# Patient Record
Sex: MALE | Race: BLACK OR AFRICAN AMERICAN
[De-identification: names, ages, dates, MRNs, and addresses within clinical notes are randomized per-mention and may not be internally consistent; named-entity substitution may affect disease eponyms.]

---

## 2020-05-09 ENCOUNTER — HOSPITAL ENCOUNTER (OUTPATIENT)
Dept: HOSPITAL 92 - ERS | Age: 65
Setting detail: OBSERVATION
LOS: 2 days | Discharge: HOME | End: 2020-05-11
Attending: SURGERY | Admitting: SURGERY
Payer: MEDICARE

## 2020-05-09 ENCOUNTER — HOSPITAL ENCOUNTER (EMERGENCY)
Dept: HOSPITAL 9 - MADERS | Age: 65
Discharge: ANOTHER HOSPITAL | End: 2020-05-09
Payer: MEDICARE

## 2020-05-09 VITALS — BODY MASS INDEX: 22.8 KG/M2
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
## 2020-05-09 DIAGNOSIS — G89.11: ICD-10-CM

## 2020-05-09 DIAGNOSIS — Z79.82: ICD-10-CM
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
## 2020-05-09 DIAGNOSIS — S52.002A: Primary | ICD-10-CM

## 2020-05-09 DIAGNOSIS — F17.210: ICD-10-CM

## 2020-05-09 DIAGNOSIS — S52.252A: Primary | ICD-10-CM

## 2020-05-09 DIAGNOSIS — S52.022A: ICD-10-CM

## 2020-05-09 DIAGNOSIS — S52.92XA: ICD-10-CM

## 2020-05-09 LAB
ALBUMIN SERPL BCG-MCNC: 4.5 G/DL (ref 3.4–4.8)
ALP SERPL-CCNC: 63 U/L (ref 40–110)
ALT SERPL W P-5'-P-CCNC: 21 U/L (ref 8–55)
ANION GAP SERPL CALC-SCNC: 18 MMOL/L (ref 10–20)
AST SERPL-CCNC: 29 U/L (ref 5–34)
BASOPHILS # BLD AUTO: 0 THOU/UL (ref 0–0.2)
BASOPHILS NFR BLD AUTO: 0.5 % (ref 0–1)
BILIRUB SERPL-MCNC: 1 MG/DL (ref 0.2–1.2)
BUN SERPL-MCNC: 7 MG/DL (ref 8.4–25.7)
CALCIUM SERPL-MCNC: 8.9 MG/DL (ref 7.8–10.44)
CHLORIDE SERPL-SCNC: 101 MMOL/L (ref 98–107)
CO2 SERPL-SCNC: 19 MMOL/L (ref 23–31)
CREAT CL PREDICTED SERPL C-G-VRATE: 0 ML/MIN (ref 70–130)
EOSINOPHIL # BLD AUTO: 0 THOU/UL (ref 0–0.7)
EOSINOPHIL NFR BLD AUTO: 0.3 % (ref 0–10)
GLOBULIN SER CALC-MCNC: 3.3 G/DL (ref 2.4–3.5)
GLUCOSE SERPL-MCNC: 111 MG/DL (ref 80–115)
HGB BLD-MCNC: 14.9 G/DL (ref 14–18)
INR PPP: 0.9
LYMPHOCYTES # BLD: 0.8 THOU/UL (ref 1.2–3.4)
LYMPHOCYTES NFR BLD AUTO: 9.8 % (ref 21–51)
MAGNESIUM SERPL-MCNC: 2 MG/DL (ref 1.6–2.6)
MCH RBC QN AUTO: 23.8 PG (ref 27–31)
MCV RBC AUTO: 75.1 FL (ref 78–98)
MONOCYTES # BLD AUTO: 0.4 THOU/UL (ref 0.11–0.59)
MONOCYTES NFR BLD AUTO: 5.1 % (ref 0–10)
NEUTROPHILS # BLD AUTO: 6.9 THOU/UL (ref 1.4–6.5)
NEUTROPHILS NFR BLD AUTO: 84.3 % (ref 42–75)
PLATELET # BLD AUTO: 137 THOU/UL (ref 130–400)
POTASSIUM SERPL-SCNC: 4.2 MMOL/L (ref 3.5–5.1)
PROTHROMBIN TIME: 11.8 SEC (ref 12–14.7)
RBC # BLD AUTO: 6.26 MILL/UL (ref 4.7–6.1)
SODIUM SERPL-SCNC: 134 MMOL/L (ref 136–145)
WBC # BLD AUTO: 8.2 THOU/UL (ref 4.8–10.8)

## 2020-05-09 PROCEDURE — 86850 RBC ANTIBODY SCREEN: CPT

## 2020-05-09 PROCEDURE — 96376 TX/PRO/DX INJ SAME DRUG ADON: CPT

## 2020-05-09 PROCEDURE — 36415 COLL VENOUS BLD VENIPUNCTURE: CPT

## 2020-05-09 PROCEDURE — 85025 COMPLETE CBC W/AUTO DIFF WBC: CPT

## 2020-05-09 PROCEDURE — 80048 BASIC METABOLIC PNL TOTAL CA: CPT

## 2020-05-09 PROCEDURE — 29125 APPL SHORT ARM SPLINT STATIC: CPT

## 2020-05-09 PROCEDURE — 93005 ELECTROCARDIOGRAM TRACING: CPT

## 2020-05-09 PROCEDURE — 96365 THER/PROPH/DIAG IV INF INIT: CPT

## 2020-05-09 PROCEDURE — 96374 THER/PROPH/DIAG INJ IV PUSH: CPT

## 2020-05-09 PROCEDURE — 71045 X-RAY EXAM CHEST 1 VIEW: CPT

## 2020-05-09 PROCEDURE — 84100 ASSAY OF PHOSPHORUS: CPT

## 2020-05-09 PROCEDURE — 97139 UNLISTED THERAPEUTIC PX: CPT

## 2020-05-09 PROCEDURE — 96375 TX/PRO/DX INJ NEW DRUG ADDON: CPT

## 2020-05-09 PROCEDURE — 97116 GAIT TRAINING THERAPY: CPT

## 2020-05-09 PROCEDURE — 85610 PROTHROMBIN TIME: CPT

## 2020-05-09 PROCEDURE — 99284 EMERGENCY DEPT VISIT MOD MDM: CPT

## 2020-05-09 PROCEDURE — 76000 FLUOROSCOPY <1 HR PHYS/QHP: CPT

## 2020-05-09 PROCEDURE — G0378 HOSPITAL OBSERVATION PER HR: HCPCS

## 2020-05-09 PROCEDURE — 96361 HYDRATE IV INFUSION ADD-ON: CPT

## 2020-05-09 PROCEDURE — S0028 INJECTION, FAMOTIDINE, 20 MG: HCPCS

## 2020-05-09 PROCEDURE — 80053 COMPREHEN METABOLIC PANEL: CPT

## 2020-05-09 PROCEDURE — 86901 BLOOD TYPING SEROLOGIC RH(D): CPT

## 2020-05-09 PROCEDURE — 83735 ASSAY OF MAGNESIUM: CPT

## 2020-05-09 PROCEDURE — 73090 X-RAY EXAM OF FOREARM: CPT

## 2020-05-09 PROCEDURE — 24685 OPTX ULNAR FX PROX END W/FIX: CPT

## 2020-05-09 PROCEDURE — 86900 BLOOD TYPING SEROLOGIC ABO: CPT

## 2020-05-09 PROCEDURE — C1713 ANCHOR/SCREW BN/BN,TIS/BN: HCPCS

## 2020-05-09 NOTE — RAD
Left elbow 2 views



HISTORY: Injury.



FINDINGS: 2 oblique views are included. There is extremely comminuted fracture involving the proximal
 ulna with one quarter shaft width volar displacement of the major fragment and mild apex volar

angulation. There are innumerable ossific fragments and overlying soft tissue swelling. Articular bhavani
face of the ulna is not visualized with these views to evaluate for intra-articular extension of

fracture.



Radiocapitellar alignment is favored to be maintained. Fluid distention of the joint capsule is appar
ent.







  



IMPRESSION :

Extensively comminuted mildly displaced and angulated proximal left ulnar fracture.



Reported By: JUAQUIN Cullen 

Electronically Signed:  5/9/2020 5:19 PM

## 2020-05-10 PROCEDURE — 0PSL04Z REPOSITION LEFT ULNA WITH INTERNAL FIXATION DEVICE, OPEN APPROACH: ICD-10-PCS | Performed by: ORTHOPAEDIC SURGERY

## 2020-05-10 RX ADMIN — CEFAZOLIN SODIUM SCH MLS: 2 SOLUTION INTRAVENOUS at 13:53

## 2020-05-10 RX ADMIN — DOCUSATE SODIUM 50 MG AND SENNOSIDES 8.6 MG SCH TAB: 8.6; 5 TABLET, FILM COATED ORAL at 21:39

## 2020-05-10 RX ADMIN — DOCUSATE SODIUM 50 MG AND SENNOSIDES 8.6 MG SCH TAB: 8.6; 5 TABLET, FILM COATED ORAL at 08:08

## 2020-05-10 RX ADMIN — CEFAZOLIN SODIUM SCH MLS: 2 SOLUTION INTRAVENOUS at 21:39
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
## 2020-05-10 NOTE — RAD
Left forearm 2 views intraoperative fluoroscopy



HISTORY: Fracture.



FINDINGS: Intraoperative fluoroscopy was provided for internal fixation of the comminuted proximal ul
fozia fracture by Dr. Merida. Spot fluoroscopic images show posterior metallic plate and multiple

screws transfixing the fracture.



Alignment is anatomic.



Fluoroscopy time 46 seconds.



Reported By: JUAQUIN Cullen 

Electronically Signed:  5/10/2020 4:56 PM

## 2020-05-10 NOTE — RAD
PORTABLE CHEST:

 

DATE: 5/10/2020.

 

PROVIDED CLINICAL HISTORY: 

Preop.

 

FINDINGS: 

The cardiac silhouette appears prominent, which may be at least partially on the basis of portable te
chnique.  There is no focal consolidation, pleural fluid, or pneumothorax apparent.

 

IMPRESSION: 

No evidence for an acute cardiopulmonary process.

 

POS: JOSIANE

## 2020-05-11 VITALS — TEMPERATURE: 98.5 F | SYSTOLIC BLOOD PRESSURE: 171 MMHG | DIASTOLIC BLOOD PRESSURE: 93 MMHG

## 2020-05-11 LAB
ANION GAP SERPL CALC-SCNC: 13 MMOL/L (ref 10–20)
BASOPHILS # BLD AUTO: 0 THOU/UL (ref 0–0.2)
BASOPHILS NFR BLD AUTO: 0.3 % (ref 0–1)
BUN SERPL-MCNC: 11 MG/DL (ref 8.4–25.7)
CALCIUM SERPL-MCNC: 8.1 MG/DL (ref 7.8–10.44)
CHLORIDE SERPL-SCNC: 100 MMOL/L (ref 98–107)
CO2 SERPL-SCNC: 25 MMOL/L (ref 23–31)
CREAT CL PREDICTED SERPL C-G-VRATE: 94 ML/MIN (ref 70–130)
EOSINOPHIL # BLD AUTO: 0 THOU/UL (ref 0–0.7)
EOSINOPHIL NFR BLD AUTO: 0.2 % (ref 0–10)
GLUCOSE SERPL-MCNC: 130 MG/DL (ref 80–115)
HGB BLD-MCNC: 12.5 G/DL (ref 14–18)
LYMPHOCYTES # BLD: 1 THOU/UL (ref 1.2–3.4)
LYMPHOCYTES NFR BLD AUTO: 11.9 % (ref 21–51)
MAGNESIUM SERPL-MCNC: 1.9 MG/DL (ref 1.6–2.6)
MCH RBC QN AUTO: 24 PG (ref 27–31)
MCV RBC AUTO: 75.8 FL (ref 78–98)
MONOCYTES # BLD AUTO: 0.6 THOU/UL (ref 0.11–0.59)
MONOCYTES NFR BLD AUTO: 7.6 % (ref 0–10)
NEUTROPHILS # BLD AUTO: 6.8 THOU/UL (ref 1.4–6.5)
NEUTROPHILS NFR BLD AUTO: 80.1 % (ref 42–75)
PLATELET # BLD AUTO: 154 THOU/UL (ref 130–400)
POTASSIUM SERPL-SCNC: 4 MMOL/L (ref 3.5–5.1)
RBC # BLD AUTO: 5.22 MILL/UL (ref 4.7–6.1)
SODIUM SERPL-SCNC: 134 MMOL/L (ref 136–145)
WBC # BLD AUTO: 8.4 THOU/UL (ref 4.8–10.8)

## 2020-05-11 RX ADMIN — CEFAZOLIN SODIUM SCH MLS: 2 SOLUTION INTRAVENOUS at 05:40

## 2020-05-11 RX ADMIN — DOCUSATE SODIUM 50 MG AND SENNOSIDES 8.6 MG SCH TAB: 8.6; 5 TABLET, FILM COATED ORAL at 09:04
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
## 2020-05-11 NOTE — PRG
DATE OF SERVICE:  05/10/2020



SUBJECTIVE:  Patient was seen during evening rounds, sitting up in hospital bed, in

no acute distress.  Patient continues to have moderate amount of pain to his left

arm.  Patient is postop repair of his severely comminuted left proximal ulnar

fracture by Dr. Merida.  Patient is tolerating a regular diet.  Patient has good

urinary output. 



OBJECTIVE:  VITAL SIGNS:  Blood pressure 156/84, 98.3 temp, pulse 66, respirations

18, and SpO2 of 95% on room air. 

GENERAL:  Well-appearing middle aged gentleman, awake, alert, in no distress. 

PULMONARY:  Equal chest rise and fall.  Respirations are even and nonlabored. 

EXTREMITIES:  Left upper extremity in a splint which is clean, dry, and intact.

Good sensation.  Cap refill less than 2.  Normal movement of fingers. 



PLAN:  Continue supportive care and pain regimen.  Patient has been instructed to

wear a sling and keep his arm elevated to help with the pain.  Continue occupational

therapy. 







Job ID:  [id number]
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
## 2020-05-16 NOTE — EKG
Test Reason : 

Blood Pressure : ***/*** mmHG

Vent. Rate : 076 BPM     Atrial Rate : 076 BPM

   P-R Int : 166 ms          QRS Dur : 088 ms

    QT Int : 396 ms       P-R-T Axes : 050 -27 014 degrees

   QTc Int : 445 ms

 

*** Poor data quality, interpretation may be adversely affected

Normal sinus rhythm

Normal ECG

 

Confirmed by JOSH CRUZ M.D. ([id number]),  MIRANDA FERNANDES ([id number]) on 5/16/2020 12:44:59 PM

 

Referred By:             Confirmed By:JOSH CRUZ M.D.

## 2020-06-19 ENCOUNTER — HOSPITAL ENCOUNTER (OUTPATIENT)
Dept: HOSPITAL 92 - SDC | Age: 65
Discharge: HOME | End: 2020-06-19
Attending: ORTHOPAEDIC SURGERY
Payer: MEDICARE

## 2020-06-19 VITALS — BODY MASS INDEX: 21.9 KG/M2

## 2020-06-19 DIAGNOSIS — S53.005A: Primary | ICD-10-CM

## 2020-06-19 PROCEDURE — 76000 FLUOROSCOPY <1 HR PHYS/QHP: CPT

## 2020-06-19 PROCEDURE — 0PSJXZZ REPOSITION LEFT RADIUS, EXTERNAL APPROACH: ICD-10-PCS | Performed by: ORTHOPAEDIC SURGERY
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
## 2020-06-19 NOTE — RAD
EXAM: 4 views of the left elbow



HISTORY: Plating of comminuted proximal ulnar fracture



COMPARISON: 6/18/2020



FINDINGS: Limited fluoroscopic views were submitted for interpretation. There is a comminuted fractur
es of the proximal ulna spanned by a plate and screws. No elbow dislocation is seen. No elbow

effusion is seen.



IMPRESSION: Status post ORIF of comminuted proximal ulnar fracture.



Reported By: Saurabh Rodriguez 

Electronically Signed:  6/19/2020 12:57 PM

## 2020-07-13 ENCOUNTER — HOSPITAL ENCOUNTER (OUTPATIENT)
Dept: HOSPITAL 92 - TBSIIMAG | Age: 65
Discharge: HOME | End: 2020-07-13
Attending: PHYSICIAN ASSISTANT
Payer: MEDICARE

## 2020-07-13 DIAGNOSIS — M19.012: ICD-10-CM

## 2020-07-13 DIAGNOSIS — S46.002A: Primary | ICD-10-CM

## 2020-07-13 DIAGNOSIS — S43.432A: ICD-10-CM

## 2020-07-13 DIAGNOSIS — S52.022D: ICD-10-CM

## 2020-07-13 DIAGNOSIS — M75.112: ICD-10-CM

## 2020-07-13 DIAGNOSIS — R60.0: ICD-10-CM

## 2020-07-13 DIAGNOSIS — S53.005D: ICD-10-CM

## 2020-07-13 NOTE — MRI
MRI LEFT SHOULDER:

 

DATE: 7/13/2020.

 

PROVIDED CLINICAL HISTORY: 

Pain.

 

FINDINGS: 

There is low-grade partial-thickness undersurface tearing involving the anterior distal supraspinatus
 tendon at the footplate.  The cranial fibers of the subscapularis also demonstrate low-grade partial
 thickness interstitial tearing near the insertion.  

 

The long-head biceps tendon appears intact and normally located.  

 

There is a markedly thickened and inhomogeneous appearance to the anterior band of the inferior gleno
humeral ligament, without evidence for disruption.  Signal inhomogeneity in the region of the anterio
r axillary recess could reflect thickening of the inferior glenohumeral ligament or synovitis.  

 

There is signal abnormality within the superior labrum compatible with SLAP tear.  The glenoid labrum
 and glenohumeral articular cartilage appear otherwise preserved.  There is probable extension to inv
olve the posterior-superior labrum in a nondisplaced manner.

 

There is a small glenohumeral joint effusion.  

 

The amount of fluid within the subacromial subdeltoid bursa is physiologic.  There is acromioclavicul
ar joint osteoarthrosis with mild mass effect upon the subjacent supraspinatus. 

 

Rotator cuff muscular volume appears preserved.  There is increased signal intensity on fluid sensiti
ve sequences involving the caudal aspects of the subscapularis.

 

No focal concerning regional marrow or muscular signal abnormality is otherwise evident.

 

IMPRESSION: 

1.  Low-grade partial-thickness tears involving the supraspinatus and subscapularis tendons as descri
bed.

 

2.  Markedly thickened and inhomogeneous appearance to the anterior band of the inferior glenohumeral
 ligament suggesting high-grade partial tearing.

 

3.  Edema involving the subscapularis muscle inferiorly compatible with muscular strain.

 

4.  Superior labrum anterior to posterior tear.

 

5.  Acromioclavicular joint osteoarthrosis.

 

POS: VILMA

## 2020-07-28 ENCOUNTER — HOSPITAL ENCOUNTER (OUTPATIENT)
Dept: HOSPITAL 92 - TBSIIMAG | Age: 65
Discharge: HOME | End: 2020-07-28
Attending: ORTHOPAEDIC SURGERY
Payer: MEDICARE

## 2020-07-28 DIAGNOSIS — T84.89XD: ICD-10-CM

## 2020-07-28 DIAGNOSIS — S52.022D: Primary | ICD-10-CM

## 2020-07-28 NOTE — CT
CT LEFT ELBOW WITHOUT CONTRAST:

7/28/20

 

HISTORY: 

Fracture. 

 

COMPARISON: 

Radiograph 7/27/20.

 

FINDINGS: 

Dorsal plate and screw fixation of the comminuted olecranon process fracture with apex lateral bowing
 from stress of the fracture. No significant healing. There is anterior displacement of the radius up
on the capitellum with small fracture of the dorsal radial head which is impacted upon the capitellum
 with no cartilage remaining at the posterior impaction. There is a minimally impacted fracture of th
e lateral radial head with 1-2 mm depression. 

 

Along the medial margin of the joint, there is an articular surface gap of the olecranon up to 5 mm  
with articulation with the trochlea. 

 

The distal humerus appears to be intact.

 

IMPRESSION: 

1.      Anterior displacement of the radial head impacted upon the capitellum with bone on bone artic
ulation and likely cartilage denuding. 

2.      Subtle impaction fracture of the posterolateral radial head with 1-2 mm articular surface dep
ression which involves approximately 10% of the articular surface. 

3.      Minimal healing of the olecranon fracture. 

4.      Along the medial aspect of the olecranon with the trochlea there is a 5 mm articular surface 
gap. 

5.      [place] lateral bowing of the malleable plate and screw fixation. 

 

POS: HOME

## 2020-07-30 ENCOUNTER — HOSPITAL ENCOUNTER (OUTPATIENT)
Dept: HOSPITAL 92 - LABBT | Age: 65
Discharge: HOME | End: 2020-07-30
Attending: ORTHOPAEDIC SURGERY
Payer: MEDICARE

## 2020-07-30 DIAGNOSIS — Z11.59: ICD-10-CM

## 2020-07-30 DIAGNOSIS — S52.022D: ICD-10-CM

## 2020-07-30 DIAGNOSIS — Z01.812: Primary | ICD-10-CM

## 2020-07-30 PROCEDURE — U0003 INFECTIOUS AGENT DETECTION BY NUCLEIC ACID (DNA OR RNA); SEVERE ACUTE RESPIRATORY SYNDROME CORONAVIRUS 2 (SARS-COV-2) (CORONAVIRUS DISEASE [COVID-19]), AMPLIFIED PROBE TECHNIQUE, MAKING USE OF HIGH THROUGHPUT TECHNOLOGIES AS DESCRIBED BY CMS-2020-01-R: HCPCS

## 2020-07-30 PROCEDURE — 87635 SARS-COV-2 COVID-19 AMP PRB: CPT

## 2020-08-03 ENCOUNTER — HOSPITAL ENCOUNTER (OUTPATIENT)
Dept: HOSPITAL 92 - SDC | Age: 65
Discharge: HOME | End: 2020-08-03
Attending: ORTHOPAEDIC SURGERY
Payer: MEDICARE

## 2020-08-03 VITALS — BODY MASS INDEX: 21.5 KG/M2

## 2020-08-03 DIAGNOSIS — S53.492A: ICD-10-CM

## 2020-08-03 DIAGNOSIS — S43.492A: ICD-10-CM

## 2020-08-03 DIAGNOSIS — G89.18: ICD-10-CM

## 2020-08-03 DIAGNOSIS — M75.112: ICD-10-CM

## 2020-08-03 DIAGNOSIS — S52.022D: ICD-10-CM

## 2020-08-03 DIAGNOSIS — M75.02: ICD-10-CM

## 2020-08-03 DIAGNOSIS — M25.832: ICD-10-CM

## 2020-08-03 DIAGNOSIS — F17.210: ICD-10-CM
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
## 2020-08-03 DIAGNOSIS — M24.422: Primary | ICD-10-CM

## 2020-08-03 PROCEDURE — 76000 FLUOROSCOPY <1 HR PHYS/QHP: CPT

## 2020-08-03 PROCEDURE — 0PSJ04Z REPOSITION LEFT RADIUS WITH INTERNAL FIXATION DEVICE, OPEN APPROACH: ICD-10-PCS | Performed by: ORTHOPAEDIC SURGERY

## 2020-08-03 PROCEDURE — 0RNKXZZ RELEASE LEFT SHOULDER JOINT, EXTERNAL APPROACH: ICD-10-PCS | Performed by: ORTHOPAEDIC SURGERY

## 2020-08-03 PROCEDURE — 3E0T3BZ INTRODUCTION OF ANESTHETIC AGENT INTO PERIPHERAL NERVES AND PLEXI, PERCUTANEOUS APPROACH: ICD-10-PCS | Performed by: ORTHOPAEDIC SURGERY

## 2020-08-03 PROCEDURE — 3E0U33Z INTRODUCTION OF ANTI-INFLAMMATORY INTO JOINTS, PERCUTANEOUS APPROACH: ICD-10-PCS | Performed by: ORTHOPAEDIC SURGERY

## 2020-08-03 NOTE — RAD
Left elbow 2 views intraoperative fluoroscopy



HISTORY: Fracture.



FINDINGS: Intraoperative fluoroscopy was provided for internal fixation as performed by Dr. Merida.
 Spot fluoroscopic images again show postoperative fixation of the proximal ulna. Long kathleen now

transfixes the radiocapitellar joint in flexion.



Fluoroscopy time 12.9 seconds



Reported By: JUAQUIN Cullen 

Electronically Signed:  8/3/2020 4:27 PM
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
## 2020-09-01 ENCOUNTER — HOSPITAL ENCOUNTER (OUTPATIENT)
Dept: HOSPITAL 92 - LABBT | Age: 65
Discharge: HOME | End: 2020-09-01
Attending: ORTHOPAEDIC SURGERY
Payer: MEDICARE

## 2020-09-01 DIAGNOSIS — S52.022A: Primary | ICD-10-CM

## 2020-09-01 DIAGNOSIS — M24.812: ICD-10-CM

## 2020-09-01 DIAGNOSIS — Z20.828: ICD-10-CM

## 2020-09-01 PROCEDURE — U0003 INFECTIOUS AGENT DETECTION BY NUCLEIC ACID (DNA OR RNA); SEVERE ACUTE RESPIRATORY SYNDROME CORONAVIRUS 2 (SARS-COV-2) (CORONAVIRUS DISEASE [COVID-19]), AMPLIFIED PROBE TECHNIQUE, MAKING USE OF HIGH THROUGHPUT TECHNOLOGIES AS DESCRIBED BY CMS-2020-01-R: HCPCS

## 2020-09-01 PROCEDURE — 87635 SARS-COV-2 COVID-19 AMP PRB: CPT

## 2020-09-04 ENCOUNTER — HOSPITAL ENCOUNTER (OUTPATIENT)
Dept: HOSPITAL 92 - SDC | Age: 65
Discharge: HOME | End: 2020-09-04
Attending: ORTHOPAEDIC SURGERY
Payer: MEDICARE

## 2020-09-04 VITALS — BODY MASS INDEX: 25.4 KG/M2

## 2020-09-04 DIAGNOSIS — S46.012A: Primary | ICD-10-CM

## 2020-09-04 DIAGNOSIS — S52.022D: ICD-10-CM

## 2020-09-04 DIAGNOSIS — M25.832: ICD-10-CM

## 2020-09-04 DIAGNOSIS — M24.812: ICD-10-CM

## 2020-09-04 DIAGNOSIS — M75.22: ICD-10-CM

## 2020-09-04 DIAGNOSIS — M75.52: ICD-10-CM

## 2020-09-04 DIAGNOSIS — F17.210: ICD-10-CM
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
## 2020-09-04 PROCEDURE — 0RBK4ZZ EXCISION OF LEFT SHOULDER JOINT, PERCUTANEOUS ENDOSCOPIC APPROACH: ICD-10-PCS | Performed by: ORTHOPAEDIC SURGERY

## 2020-09-04 PROCEDURE — 76000 FLUOROSCOPY <1 HR PHYS/QHP: CPT

## 2020-09-04 PROCEDURE — 0RPM04Z REMOVAL OF INTERNAL FIXATION DEVICE FROM LEFT ELBOW JOINT, OPEN APPROACH: ICD-10-PCS | Performed by: ORTHOPAEDIC SURGERY

## 2020-09-04 PROCEDURE — 73090 X-RAY EXAM OF FOREARM: CPT

## 2020-09-04 PROCEDURE — 20680 REMOVAL OF IMPLANT DEEP: CPT

## 2020-09-04 PROCEDURE — A4306 DRUG DELIVERY SYSTEM <=50 ML: HCPCS

## 2020-09-04 PROCEDURE — 29823 SHO ARTHRS SRG XTNSV DBRDMT: CPT
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
## 2020-09-04 NOTE — RAD
LEFT FOREARM TWO VIEWS:

9/4/20

 

HISTORY: 

Open reduction of elbow fracture.

 

Two C-arm views are presented for interpretation. This is compared to an 8/3/20 exam. A plate and scr
ews along the proximal ulna are again noted. The pin through the distal radius has been removed. The 
wrist is difficult to assess on this film due to positioning. On this view, the ulna appears to overr
lavelle the proximal carpal row. 

 

IMPRESSION: 

Intraoperative film as discussed above. 

 

POS: OFF

## 2020-11-05 ENCOUNTER — HOSPITAL ENCOUNTER (OUTPATIENT)
Dept: HOSPITAL 92 - LABBT | Age: 65
Discharge: HOME | End: 2020-11-05
Attending: ORTHOPAEDIC SURGERY
Payer: COMMERCIAL

## 2020-11-05 DIAGNOSIS — Z01.818: Primary | ICD-10-CM

## 2020-11-05 DIAGNOSIS — S53.002A: ICD-10-CM

## 2020-11-05 DIAGNOSIS — Z20.828: ICD-10-CM

## 2020-11-05 LAB
BASOPHILS # BLD AUTO: 0 10X3/UL (ref 0–0.2)
BASOPHILS NFR BLD AUTO: 0.6 % (ref 0–2)
EOSINOPHIL # BLD AUTO: 0.1 10X3/UL (ref 0–0.5)
EOSINOPHIL NFR BLD AUTO: 1.3 % (ref 0–6)
HGB BLD-MCNC: 12.4 G/DL (ref 14–18)
INR PPP: 1
LYMPHOCYTES NFR BLD AUTO: 28.6 % (ref 18–47)
MCH RBC QN AUTO: 22 PG (ref 27–33)
MCV RBC AUTO: 70.6 FL (ref 80–100)
MONOCYTES # BLD AUTO: 0.4 10X3/UL (ref 0–1.1)
MONOCYTES NFR BLD AUTO: 6.9 % (ref 0–10)
NEUTROPHILS # BLD AUTO: 4 10X3/UL (ref 1.5–8.4)
NEUTROPHILS NFR BLD AUTO: 62.3 % (ref 40–75)
PLATELET # BLD AUTO: 212 10X3/UL (ref 130–400)
PROTHROMBIN TIME: 10.3 SEC (ref 9.5–12.1)
RBC # BLD AUTO: 5.64 10X6/UL (ref 4.4–5.8)
WBC # BLD AUTO: 6.4 10X3/UL (ref 4.5–11)

## 2020-11-05 PROCEDURE — 85025 COMPLETE CBC W/AUTO DIFF WBC: CPT

## 2020-11-05 PROCEDURE — 87635 SARS-COV-2 COVID-19 AMP PRB: CPT

## 2020-11-05 PROCEDURE — 85610 PROTHROMBIN TIME: CPT

## 2020-11-05 PROCEDURE — 93005 ELECTROCARDIOGRAM TRACING: CPT

## 2020-11-05 PROCEDURE — U0003 INFECTIOUS AGENT DETECTION BY NUCLEIC ACID (DNA OR RNA); SEVERE ACUTE RESPIRATORY SYNDROME CORONAVIRUS 2 (SARS-COV-2) (CORONAVIRUS DISEASE [COVID-19]), AMPLIFIED PROBE TECHNIQUE, MAKING USE OF HIGH THROUGHPUT TECHNOLOGIES AS DESCRIBED BY CMS-2020-01-R: HCPCS

## 2020-11-05 PROCEDURE — 93010 ELECTROCARDIOGRAM REPORT: CPT

## 2020-11-08 NOTE — EKG
Test Reason : EKG

Blood Pressure : ***/*** mmHG

Vent. Rate : 073 BPM     Atrial Rate : 073 BPM

   P-R Int : 154 ms          QRS Dur : 088 ms

    QT Int : 372 ms       P-R-T Axes : 069 -06 048 degrees

   QTc Int : 409 ms

 

Normal sinus rhythm

Normal ECG

No previous ECGs available

Confirmed by RICHY WEATHERS ([id number]) on 11/8/2020 8:53:25 PM

 

Referred By:  DR HAAS           Confirmed By:RICHY WEATHERS

## 2020-11-10 ENCOUNTER — HOSPITAL ENCOUNTER (OUTPATIENT)
Dept: HOSPITAL 92 - SDC | Age: 65
Discharge: HOME | End: 2020-11-10
Attending: ORTHOPAEDIC SURGERY
Payer: MEDICARE

## 2020-11-10 DIAGNOSIS — S63.592A: ICD-10-CM

## 2020-11-10 DIAGNOSIS — Z53.9: ICD-10-CM

## 2020-11-10 DIAGNOSIS — F17.210: ICD-10-CM

## 2020-11-10 DIAGNOSIS — S53.002A: ICD-10-CM
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
## 2020-11-10 DIAGNOSIS — M25.832: Primary | ICD-10-CM

## 2020-11-10 LAB
RBC UR QL AUTO: (no result) HPF (ref 0–3)
SP GR UR STRIP: 1.01 (ref 1–1.04)
WBC UR QL AUTO: (no result) HPF (ref 0–3)

## 2020-11-10 PROCEDURE — S0020 INJECTION, BUPIVICAINE HYDRO: HCPCS

## 2020-11-10 PROCEDURE — 81001 URINALYSIS AUTO W/SCOPE: CPT

## 2020-11-11 ENCOUNTER — HOSPITAL ENCOUNTER (OUTPATIENT)
Dept: HOSPITAL 92 - LABBT | Age: 65
Discharge: HOME | End: 2020-11-11
Attending: ORTHOPAEDIC SURGERY
Payer: MEDICARE

## 2020-11-11 DIAGNOSIS — Z20.828: ICD-10-CM

## 2020-11-11 DIAGNOSIS — Z01.812: Primary | ICD-10-CM

## 2020-11-11 DIAGNOSIS — M25.839: ICD-10-CM

## 2020-11-11 DIAGNOSIS — S63.592A: ICD-10-CM

## 2020-11-11 PROCEDURE — 87635 SARS-COV-2 COVID-19 AMP PRB: CPT

## 2020-11-11 PROCEDURE — U0003 INFECTIOUS AGENT DETECTION BY NUCLEIC ACID (DNA OR RNA); SEVERE ACUTE RESPIRATORY SYNDROME CORONAVIRUS 2 (SARS-COV-2) (CORONAVIRUS DISEASE [COVID-19]), AMPLIFIED PROBE TECHNIQUE, MAKING USE OF HIGH THROUGHPUT TECHNOLOGIES AS DESCRIBED BY CMS-2020-01-R: HCPCS

## 2020-11-13 ENCOUNTER — HOSPITAL ENCOUNTER (OUTPATIENT)
Dept: HOSPITAL 92 - SDC | Age: 65
Discharge: HOME | End: 2020-11-13
Attending: ORTHOPAEDIC SURGERY
Payer: MEDICARE

## 2020-11-13 VITALS — BODY MASS INDEX: 25.4 KG/M2
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
## 2020-11-13 DIAGNOSIS — F17.200: ICD-10-CM

## 2020-11-13 DIAGNOSIS — M21.122: ICD-10-CM

## 2020-11-13 DIAGNOSIS — S52.122P: Primary | ICD-10-CM

## 2020-11-13 DIAGNOSIS — S52.272P: ICD-10-CM

## 2020-11-13 PROCEDURE — 24666 OPTX RADIAL HEAD/NCK FX RPLC: CPT

## 2020-11-13 PROCEDURE — C1713 ANCHOR/SCREW BN/BN,TIS/BN: HCPCS

## 2020-11-13 PROCEDURE — 25405 REPAIR/GRAFT RADIUS OR ULNA: CPT

## 2020-11-13 PROCEDURE — 0PUL07Z SUPPLEMENT LEFT ULNA WITH AUTOLOGOUS TISSUE SUBSTITUTE, OPEN APPROACH: ICD-10-PCS | Performed by: ORTHOPAEDIC SURGERY

## 2020-11-13 PROCEDURE — C1776 JOINT DEVICE (IMPLANTABLE): HCPCS

## 2020-11-13 PROCEDURE — 0PSL04Z REPOSITION LEFT ULNA WITH INTERNAL FIXATION DEVICE, OPEN APPROACH: ICD-10-PCS | Performed by: ORTHOPAEDIC SURGERY

## 2020-11-13 PROCEDURE — 76000 FLUOROSCOPY <1 HR PHYS/QHP: CPT

## 2020-11-13 PROCEDURE — 73090 X-RAY EXAM OF FOREARM: CPT

## 2020-11-13 PROCEDURE — 0PRJ0JZ REPLACEMENT OF LEFT RADIUS WITH SYNTHETIC SUBSTITUTE, OPEN APPROACH: ICD-10-PCS | Performed by: ORTHOPAEDIC SURGERY

## 2020-11-13 PROCEDURE — S0020 INJECTION, BUPIVICAINE HYDRO: HCPCS

## 2020-11-13 NOTE — RAD
EXAM: 2 views of the left forearm



HISTORY: Left ulnar shortening



COMPARISON: 10/7/2020



FINDINGS: Multiple limited intraoperative fluoroscopic views shows the patient is status post replace
ment of the radial head. A plate and screws is seen on the proximal ulna. There is anatomic

alignment of the elbow joint.



IMPRESSION: Postsurgical changes of the left elbow as above



Reported By: Saurabh Rodriguez 

Electronically Signed:  11/13/2020 6:53 PM
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
## 2020-11-15 NOTE — OP
DATE OF PROCEDURE:  11/13/2020



ASSISTANT:  Dr. Paolo Caballero.



PREOPERATIVE DIAGNOSES:  

1. 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

subluxation. 

2. Elbow contracture.



PROCEDURES PERFORMED:  

1. Hardware removal deep.

2. Osteotomy of ulna/olecranon.

3. Open reduction and internal fixation of ulna/olecranon.

4. Radial head replacement.

5. Contracture release.

6. Bone graft minor.



ANESTHESIA:  The patient received a general intubation.



ESTIMATED BLOOD LOSS:  200 mL.



TOURNIQUET TIME:  None.



ANTIBIOTICS:  Ancef 2 g.



IMPLANTS:  Evolve size +2 head with a size 22 mm stem with a+2 22 mm head.  The

patient had a standard 6.5 stem.  The patient had a 2.7/3.5 variable angle LCP

olecranon plate with three 3.5 screws, one 2.7 screw, four 3.5 cortical screws, 
one

2.7 nonlocking cortical screw and then one 2.7 cortical screw, three 2.7 locking

screws. 



COMPLICATIONS:  None.



HISTORY OF PRESENT ILLNESS:  Mr. Bradshaw is a 65-year-old male with contracted 
elbow,

had olecranon that looks like it is in about 16 degrees of varus medial 
osteotomy. He had damage to radial head on radiographs requiring a replacemebnt

contracture release, possible nerve transposition, collateral ligament

reconstruction, hardware removal.  The patient understood we have to osteotomize
the

ulna, fix the ulnar, replace radial head, perform a contracture release, remove 
the

previous hardware and then graft as needed.  He understood the risks and 
benefits of

procedure to include pain, scar, bleeding, infection, damage to vital 
structures,

decreased range of motion, strength, malunion, nonunion, loss of range of 
motion,

function, blood clots, loss of life and limb, the patient understood the risks 
and

benefits and elected to proceed. 



DESCRIPTION OF PROCEDURE:  Time-out was performed designating the patient's left

upper extremity as the operative site, based on site, consent and markings.  
After

time-out, the patient's left upper extremity was prepped and draped in normal

fashion.  His arm  was in a  lateral position with his arm across the chest and

made incision down through skin, down over the plate, creating a plane flap 
radially

over the patient's lateral epicondyle through Kocher interval to create a place 
for

radial head.  We came down medially and laterally and developed on the bone with

sharp and cautery to expose the ulna.  Removed all the locking screws, 
nonlocking

screws from the plate and excised the plate.  I looked in AP and lateral 
radiographs

and at the apex, there is deformity where a center screw was.  For our 
osteotomy, we

placed drill holes with 1.5 mm drill.  I then used a saw to help to connect 
those

for a dome osteotomy to complete the osteotomy.  We then translated the ulna 
about 3 mm to go

from a varus to a valgus position.  We placed a screw distally and with 3.5 in

center hole, placed first one screw and removed it and placed two K-wires to 
pin,

looked under AP and lateral radiographs to ensure that we liked the 
straightening.

We had straightened out the plates ulnar bend and felt like we had gotten

about the 10 or 15 degree correction that we needed.  We placed 2.7 cortical 
screw

in the proximal segment and another 2.7 screw and a locking screw to help 
compress

through the plate. On compressing with the plate, we got good overall alignment.
 We

liked that and we left the two screws proximal and distal and then moved to the

patient's radial head.  We came down on the previous Kocher interval, developed

extensor, took down remnant inner ligament, developed a plane on the lateral 
aspect

of the humerus using cautery and sharp transection  of the annular ligament,

which we cut and removed.  We placed Hohmann retractors anterior and posterior 
and

cut and removed the entirety of the patient's head.  We then sized it, felt that
a

22 was the appropriate size based on cartilage.  We then drilled.  We started

reaming, opening reamer up to almost 7.5 and elected to place 6.5 with a 22 mm 
head.

 After removing it and we washed, felt like it was still a little bit subluxed. 
We

removed all the scar and soft tissue from the patient's lesser sigmoid notch and

ensured that the head was completely released as well as gave a little bit of an

apex dorsal position for the head, felt like it could toggle into position when 
we

closed the soft tissue and we were overall happy with the alignment.  Therefore,
we

washed, placed our final implant in place and closed.  We did our contracture

release, releasing off the ulna all the capsule.  We did not do a complete 
capsule

excision.  The scar plane was difficult to discern.  We were concerned about the

PIN, but we released all the soft tissues across the entirety of the distal 
humerus

to expose and ensure that it was completely released.  We then moved and worked 
with

range of motion about  degrees.  We then removed, placed our final 
implant.

We closed just 0 Vicryl or Kocher interval, the extensor plane.  We took AP and

lateral radiographs showing this in the right position.  We then moved back to 
our

plate.  We placed another two 2.7 locking screws obliquely into the segment 
making

sure they were out of bone.  We placed two 3.5 screws distally compressing to 
the

bone, but we had taken the radial head, ground up the bone and bone grafted it 
on

the ulnar aspect of the ulna.  On completion of bone graft compressing to the 
plate,

we washed, we closed the remainder of the Kocher interval with 0 Vicryl running.
 We

closed the skin with 0 Vicryl, closed subcu with running 2-0, and closed it with

staples. 



There was no tourniquet time for this case.   



The patient's flexor release was performed by Dr. Caballero following.  He was

re-dosed with Ancef afterwards.  The patient will be discharged home based on 
pain

control and will be on a [place] as needed.  The patient will be placed in

extension and splint.  Will follow up with us on Monday and will be sent home 
with

pain medications. 





 My assistant helped with the approach, retraction of soft tissues, operative 
fixation of the ulna, removal of radial head, implantation, closure, and 
splinting.

Job ID:  [id number]



[facility]

## 2020-11-16 NOTE — OP
DATE OF PROCEDURE:  11/13/2020



PREOPERATIVE DIAGNOSIS:  __________ contracture, palmar with loss of extension at

the proximal interphalangeal joint, left small finger. 



PROCEDURE PERFORMED:  Open arthrotomy with release of the checkrein ligaments and

the volar plate, left small finger. 



TOURNIQUET TIME:  20 minutes.



ESTIMATED BLOOD LOSS:  5 mL.



DESCRIPTION OF PROCEDURE:  After successful general endotracheal anesthesia, the

limb was prepped and draped.  The procedure was performed primarily by Dr. Patino. 



He had 10 mL of 0.5% Marcaine __________.  We then were able to exsanguinate the

limb, inflated tourniquet to 250 mmHg pressure.  We then made a zigzag Brunner

incision beginning 1 cm distal and 15 mm proximal to the palmar PIP joint crease.

We carried this through skin and subcutaneous tissue, and we identified

neurovascular bundle.  We used blunt dissection with a right angle to free the

tissue, and we then identified the tendon sheath.  We also identified where there

was marked constriction and checkrein ligaments.  We then __________ neurovascular

bundle and the flexor sheath protecting neurovascular bundle first on the ulnar

side, found the flexor tendons, lifted them up and saw a very thick volar plate.  We

released the volar plate on both sides of the joint, which would be radial to ulna

and from distal to proximal.  Once this was done, then we repeated the same in an

__________ protected the neurovascular bundle between neurovascular bundle and the

flexor tendons to expose the volar plate and joint.  This was done, and we were able

to even easily manipulate without undue tension any tendon damage the PIP joint of

the small finger to 0 degrees. 



I released the tourniquet and obtained hemostasis.  We closed the wound with

interrupted 5-0 nylon in simple pattern.  The patient had 0 degrees of extension

easily 

without strain back.  This compared to __________ degrees loss of extension with

marked resistance. 







Job ID:  [id number]